# Patient Record
Sex: FEMALE | Race: WHITE | Employment: UNEMPLOYED | URBAN - METROPOLITAN AREA
[De-identification: names, ages, dates, MRNs, and addresses within clinical notes are randomized per-mention and may not be internally consistent; named-entity substitution may affect disease eponyms.]

---

## 2021-06-24 ENCOUNTER — APPOINTMENT (OUTPATIENT)
Dept: GENERAL RADIOLOGY | Age: 21
End: 2021-06-24
Payer: COMMERCIAL

## 2021-06-24 ENCOUNTER — HOSPITAL ENCOUNTER (EMERGENCY)
Age: 21
Discharge: HOME | End: 2021-06-24
Payer: COMMERCIAL

## 2021-06-24 VITALS
BODY MASS INDEX: 18.96 KG/M2 | WEIGHT: 107 LBS | RESPIRATION RATE: 14 BRPM | TEMPERATURE: 99.6 F | HEIGHT: 63 IN | OXYGEN SATURATION: 98 % | SYSTOLIC BLOOD PRESSURE: 119 MMHG | DIASTOLIC BLOOD PRESSURE: 57 MMHG | HEART RATE: 80 BPM

## 2021-06-24 DIAGNOSIS — J02.9 VIRAL PHARYNGITIS: ICD-10-CM

## 2021-06-24 DIAGNOSIS — R05.9 COUGH: Primary | ICD-10-CM

## 2021-06-24 DIAGNOSIS — B34.9 VIRAL ILLNESS: ICD-10-CM

## 2021-06-24 LAB
SARS-COV-2, NAAT: NOT DETECTED
STREP GRP A PCR: NEGATIVE

## 2021-06-24 PROCEDURE — 99284 EMERGENCY DEPT VISIT MOD MDM: CPT

## 2021-06-24 PROCEDURE — 87880 STREP A ASSAY W/OPTIC: CPT

## 2021-06-24 PROCEDURE — 6370000000 HC RX 637 (ALT 250 FOR IP): Performed by: NURSE PRACTITIONER

## 2021-06-24 PROCEDURE — 71046 X-RAY EXAM CHEST 2 VIEWS: CPT

## 2021-06-24 PROCEDURE — 87635 SARS-COV-2 COVID-19 AMP PRB: CPT

## 2021-06-24 PROCEDURE — 99283 EMERGENCY DEPT VISIT LOW MDM: CPT

## 2021-06-24 RX ORDER — ACETAMINOPHEN 500 MG
1000 TABLET ORAL ONCE
Status: COMPLETED | OUTPATIENT
Start: 2021-06-24 | End: 2021-06-24

## 2021-06-24 RX ADMIN — ACETAMINOPHEN 1000 MG: 500 TABLET ORAL at 11:55

## 2021-06-24 ASSESSMENT — PAIN DESCRIPTION - PROGRESSION: CLINICAL_PROGRESSION: NOT CHANGED

## 2021-06-24 ASSESSMENT — PAIN SCALES - GENERAL
PAINLEVEL_OUTOF10: 9
PAINLEVEL_OUTOF10: 9

## 2021-06-24 ASSESSMENT — PAIN DESCRIPTION - FREQUENCY: FREQUENCY: CONTINUOUS

## 2021-06-24 ASSESSMENT — PAIN DESCRIPTION - DESCRIPTORS: DESCRIPTORS: SORE

## 2021-06-24 ASSESSMENT — PAIN DESCRIPTION - LOCATION: LOCATION: THROAT

## 2021-06-24 NOTE — ED PROVIDER NOTES
[address] [place] [address]  Department of Emergency Medicine   ED  Encounter Note  Admit Date/RoomTime: 2021 11:23 AM  ED Room: 30-A/30-A    NAME: Zaida Mock  : 2000  MRN: [id number]     Chief Complaint:  Pharyngitis (x 2 days) and Cough (productive (green) x 2 days-hurts to breathe)    History of Present Illness        Zaida Mock is a 23 y.o. old female who presents to the emergency department by private vehicle, with sudden onset productive cough with sputum described as white, yellow and green which began 2 day(s) prior to arrival.  The symptoms are associated with nasal congestion, runny nose and sore throat and denies abdominal pain, AICD firing, calf pain, chest pain, dizziness, fatigue, leg swelling, palpitations, rapid heart beat, shortness of breath, slow heart beat or syncope. She has prior history of no history of pneumonia or bronchitis in the past.  Since onset the symptoms have been remaining constant and mild in severity. The symptoms are aggravated by nothing and relieved by nothing. ROS   Pertinent positives and negatives are stated within HPI, all other systems reviewed and are negative. Past Medical History:  has no past medical history on file. Surgical History:  has no past surgical history on file. Social History:  reports that she has never smoked. She does not have any smokeless tobacco history on file. She reports that she does not drink alcohol and does not use drugs. Family History: family history is not on file.      Allergies: Penicillins    Physical Exam   Oxygen Saturation Interpretation: Normal.        ED Triage Vitals   BP Temp Temp Source Pulse Resp SpO2 Height Weight   21 1121 21 1121 21 1121 21 1116 21 1121 21 1116 21 1121 21 1121   (!) 119/57 99.6 °F (37.6 °C) Tympanic 98 14 99 % 5' 3\" (1.6 m) 107 lb (48.5 kg)         Constitutional:  Alert, development consistent with age. HEENT:  NC/NT. Airway patent. Mild erythema and 1+ tonsillar hypertrophy bilateral.  Uvula is midline. Clear nasal congestion noted nostrils. TMs are normal bilateral.  Sterile ear canals are normal bilateral  Neck:  Normal ROM. Supple. Respiratory:  Breath sounds: equal bilaterally. Lung sounds: normal.   CV:  Regular rate and rhythm, normal heart sounds, without pathological murmurs, ectopy, gallops, or rubs. .  GI:  Abdomen Soft, nontender, good bowel sounds. No firm or pulsatile mass. Integument:  Normal turgor. Warm, dry, without visible rash. Lymphatic:  Edema:  none Bilateral lower extremity(s). Neurological:  Oriented. Motor functions intact. Lab / Imaging Results   (All laboratory and radiology results have been personally reviewed by myself)  Labs:  Results for orders placed or performed during the hospital encounter of 06/24/21   COVID-19, Rapid    Specimen: Nasopharyngeal Swab   Result Value Ref Range    SARS-CoV-2, NAAT Not Detected Not Detected   Strep Screen Group A Throat    Specimen: Throat   Result Value Ref Range    Strep Grp A PCR Negative Negative     Imaging: All Radiology results interpreted by Radiologist unless otherwise noted. XR CHEST (2 VW)   Final Result   No acute process. ED Course / Medical Decision Making     Medications   acetaminophen (TYLENOL) tablet 1,000 mg (1,000 mg Oral Given 6/24/21 1155)        Consult(s):   None    Procedure(s):   none    Medical Decision Making:    Patient presents to the ED for cough and sore throat. Differential diagnoses included but not limited to Covid versus strep versus pneumonia. Workup in the ED revealed vital signs revealed a slight temp of 99.6. Patient is drinking without any difficulties. COVID-19 was not detected. Strep was negative. Tonsil revealed mild erythema and 1+ tonsillar hypertrophy bilateral.  Uvula is midline. Patient is not hypoxic or tachycardic.   Chest x-ray reveals no acute processes. Patient continues to be non-toxic on re-evaluation. Findings were discussed with the patient and reasons to immediately return to the ED were articulated to them. They will follow-up with their PMD.      Plan of Care: Normal progression of disease discussed. All questions answered. Explained the rationale for symptomatic treatment rather than use of an antibiotic. Instruction provided in the use of fluids, vaporizer, acetaminophen, and other OTC medication for symptom control. Extra fluids  Analgesics as needed, dose reviewed. Plan of Care/Counseling:  FAUSTO Gunter CNP reviewed today's visit with the patient in addition to providing specific details for the plan of care and counseling regarding the diagnosis and prognosis. Questions are answered at this time and are agreeable with the plan. Assessment      1. Cough    2. Viral pharyngitis    3. Viral illness      Plan   Discharged home. Patient condition is stable    New Medications     There are no discharge medications for this patient. Electronically signed by FAUSTO Gunter CNP   DD: 6/24/21  **This report was transcribed using voice recognition software. Every effort was made to ensure accuracy; however, inadvertent computerized transcription errors may be present.   END OF ED PROVIDER NOTE       FAUSTO Gunter CNP  06/24/21 [id number]

## 2022-03-15 ENCOUNTER — APPOINTMENT (OUTPATIENT)
Dept: GENERAL RADIOLOGY | Age: 22
DRG: 885 | End: 2022-03-15
Payer: COMMERCIAL

## 2022-03-15 ENCOUNTER — APPOINTMENT (OUTPATIENT)
Dept: CT IMAGING | Age: 22
DRG: 885 | End: 2022-03-15
Payer: COMMERCIAL

## 2022-03-15 ENCOUNTER — HOSPITAL ENCOUNTER (INPATIENT)
Age: 22
LOS: 3 days | Discharge: HOME | DRG: 885 | End: 2022-03-18
Attending: EMERGENCY MEDICINE | Admitting: PSYCHIATRY & NEUROLOGY
Payer: COMMERCIAL

## 2022-03-15 DIAGNOSIS — F39 MOOD DISORDER (HCC): Primary | ICD-10-CM

## 2022-03-15 PROBLEM — F33.9 MDD (MAJOR DEPRESSIVE DISORDER), RECURRENT EPISODE (HCC): Status: ACTIVE | Noted: 2022-03-15

## 2022-03-15 LAB
ACETAMINOPHEN LEVEL: <5 MCG/ML (ref 10–30)
ALBUMIN SERPL-MCNC: 5.2 G/DL (ref 3.5–5.2)
ALP BLD-CCNC: 62 U/L (ref 35–104)
ALT SERPL-CCNC: 12 U/L (ref 0–32)
AMPHETAMINE SCREEN, URINE: NOT DETECTED
ANION GAP SERPL CALCULATED.3IONS-SCNC: 16 MMOL/L (ref 7–16)
AST SERPL-CCNC: 23 U/L (ref 0–31)
BACTERIA: ABNORMAL /HPF
BARBITURATE SCREEN URINE: NOT DETECTED
BASOPHILS ABSOLUTE: 0.03 E9/L (ref 0–0.2)
BASOPHILS RELATIVE PERCENT: 0.2 % (ref 0–2)
BENZODIAZEPINE SCREEN, URINE: NOT DETECTED
BILIRUB SERPL-MCNC: 0.8 MG/DL (ref 0–1.2)
BILIRUBIN DIRECT: <0.2 MG/DL (ref 0–0.3)
BILIRUBIN URINE: NEGATIVE
BILIRUBIN, INDIRECT: NORMAL MG/DL (ref 0–1)
BLOOD, URINE: ABNORMAL
BUN BLDV-MCNC: 7 MG/DL (ref 6–20)
CALCIUM SERPL-MCNC: 9.2 MG/DL (ref 8.6–10.2)
CANNABINOID SCREEN URINE: POSITIVE
CHLORIDE BLD-SCNC: 106 MMOL/L (ref 98–107)
CLARITY: CLEAR
CO2: 18 MMOL/L (ref 22–29)
COCAINE METABOLITE SCREEN URINE: NOT DETECTED
COLOR: YELLOW
CREAT SERPL-MCNC: 0.8 MG/DL (ref 0.5–1)
EKG ATRIAL RATE: 70 BPM
EKG P AXIS: 19 DEGREES
EKG P-R INTERVAL: 136 MS
EKG Q-T INTERVAL: 376 MS
EKG QRS DURATION: 98 MS
EKG QTC CALCULATION (BAZETT): 406 MS
EKG R AXIS: 86 DEGREES
EKG T AXIS: 50 DEGREES
EKG VENTRICULAR RATE: 70 BPM
EOSINOPHILS ABSOLUTE: 0 E9/L (ref 0.05–0.5)
EOSINOPHILS RELATIVE PERCENT: 0 % (ref 0–6)
EPITHELIAL CELLS, UA: ABNORMAL /HPF
ETHANOL: 196 MG/DL (ref 0–0.08)
ETHANOL: 202 MG/DL (ref 0–0.08)
ETHANOL: 45 MG/DL (ref 0–0.08)
FENTANYL SCREEN, URINE: NOT DETECTED
GFR AFRICAN AMERICAN: >60
GFR NON-AFRICAN AMERICAN: >60 ML/MIN/1.73
GLUCOSE BLD-MCNC: 106 MG/DL (ref 74–99)
GLUCOSE URINE: NEGATIVE MG/DL
HCG, URINE, POC: NEGATIVE
HCT VFR BLD CALC: 41.3 % (ref 34–48)
HEMOGLOBIN: 14 G/DL (ref 11.5–15.5)
IMMATURE GRANULOCYTES #: 0.12 E9/L
IMMATURE GRANULOCYTES %: 0.8 % (ref 0–5)
INFLUENZA A: NOT DETECTED
INFLUENZA B: NOT DETECTED
KETONES, URINE: ABNORMAL MG/DL
LEUKOCYTE ESTERASE, URINE: NEGATIVE
LYMPHOCYTES ABSOLUTE: 1.3 E9/L (ref 1.5–4)
LYMPHOCYTES RELATIVE PERCENT: 8.5 % (ref 20–42)
Lab: ABNORMAL
Lab: NORMAL
MCH RBC QN AUTO: 31.6 PG (ref 26–35)
MCHC RBC AUTO-ENTMCNC: 33.9 % (ref 32–34.5)
MCV RBC AUTO: 93.2 FL (ref 80–99.9)
METHADONE SCREEN, URINE: NOT DETECTED
MONOCYTES ABSOLUTE: 0.2 E9/L (ref 0.1–0.95)
MONOCYTES RELATIVE PERCENT: 1.3 % (ref 2–12)
NEGATIVE QC PASS/FAIL: NORMAL
NEUTROPHILS ABSOLUTE: 13.71 E9/L (ref 1.8–7.3)
NEUTROPHILS RELATIVE PERCENT: 89.2 % (ref 43–80)
NITRITE, URINE: NEGATIVE
OPIATE SCREEN URINE: NOT DETECTED
OXYCODONE URINE: NOT DETECTED
PDW BLD-RTO: 11.9 FL (ref 11.5–15)
PH UA: 5.5 (ref 5–9)
PHENCYCLIDINE SCREEN URINE: NOT DETECTED
PLATELET # BLD: 321 E9/L (ref 130–450)
PMV BLD AUTO: 10.5 FL (ref 7–12)
POSITIVE QC PASS/FAIL: NORMAL
POTASSIUM REFLEX MAGNESIUM: 4 MMOL/L (ref 3.5–5)
PROTEIN UA: ABNORMAL MG/DL
RBC # BLD: 4.43 E12/L (ref 3.5–5.5)
RBC UA: ABNORMAL /HPF (ref 0–2)
SALICYLATE, SERUM: <0.3 MG/DL (ref 0–30)
SARS-COV-2 RNA, RT PCR: NOT DETECTED
SODIUM BLD-SCNC: 140 MMOL/L (ref 132–146)
SPECIFIC GRAVITY UA: 1.01 (ref 1–1.03)
TOTAL PROTEIN: 8 G/DL (ref 6.4–8.3)
TRICYCLIC ANTIDEPRESSANTS SCREEN SERUM: NEGATIVE NG/ML
UROBILINOGEN, URINE: 0.2 E.U./DL
VALPROIC ACID LEVEL: 3 MCG/ML (ref 50–100)
WBC # BLD: 15.4 E9/L (ref 4.5–11.5)
WBC UA: ABNORMAL /HPF (ref 0–5)

## 2022-03-15 PROCEDURE — 70450 CT HEAD/BRAIN W/O DYE: CPT

## 2022-03-15 PROCEDURE — 81001 URINALYSIS AUTO W/SCOPE: CPT

## 2022-03-15 PROCEDURE — 6370000000 HC RX 637 (ALT 250 FOR IP): Performed by: EMERGENCY MEDICINE

## 2022-03-15 PROCEDURE — 93010 ELECTROCARDIOGRAM REPORT: CPT | Performed by: INTERNAL MEDICINE

## 2022-03-15 PROCEDURE — 80164 ASSAY DIPROPYLACETIC ACD TOT: CPT

## 2022-03-15 PROCEDURE — 93005 ELECTROCARDIOGRAM TRACING: CPT | Performed by: STUDENT IN AN ORGANIZED HEALTH CARE EDUCATION/TRAINING PROGRAM

## 2022-03-15 PROCEDURE — 71045 X-RAY EXAM CHEST 1 VIEW: CPT

## 2022-03-15 PROCEDURE — 82077 ASSAY SPEC XCP UR&BREATH IA: CPT

## 2022-03-15 PROCEDURE — 72125 CT NECK SPINE W/O DYE: CPT

## 2022-03-15 PROCEDURE — 6370000000 HC RX 637 (ALT 250 FOR IP): Performed by: PSYCHIATRY & NEUROLOGY

## 2022-03-15 PROCEDURE — 1240000000 HC EMOTIONAL WELLNESS R&B

## 2022-03-15 PROCEDURE — 80143 DRUG ASSAY ACETAMINOPHEN: CPT

## 2022-03-15 PROCEDURE — 80179 DRUG ASSAY SALICYLATE: CPT

## 2022-03-15 PROCEDURE — 73130 X-RAY EXAM OF HAND: CPT

## 2022-03-15 PROCEDURE — 6370000000 HC RX 637 (ALT 250 FOR IP): Performed by: STUDENT IN AN ORGANIZED HEALTH CARE EDUCATION/TRAINING PROGRAM

## 2022-03-15 PROCEDURE — 36415 COLL VENOUS BLD VENIPUNCTURE: CPT

## 2022-03-15 PROCEDURE — 80076 HEPATIC FUNCTION PANEL: CPT

## 2022-03-15 PROCEDURE — 87636 SARSCOV2 & INF A&B AMP PRB: CPT

## 2022-03-15 PROCEDURE — 80307 DRUG TEST PRSMV CHEM ANLYZR: CPT

## 2022-03-15 PROCEDURE — 99285 EMERGENCY DEPT VISIT HI MDM: CPT

## 2022-03-15 PROCEDURE — 80048 BASIC METABOLIC PNL TOTAL CA: CPT

## 2022-03-15 PROCEDURE — 73521 X-RAY EXAM HIPS BI 2 VIEWS: CPT

## 2022-03-15 PROCEDURE — 85025 COMPLETE CBC W/AUTO DIFF WBC: CPT

## 2022-03-15 RX ORDER — TRAZODONE HYDROCHLORIDE 50 MG/1
50 TABLET ORAL NIGHTLY PRN
Status: DISCONTINUED | OUTPATIENT
Start: 2022-03-15 | End: 2022-03-18 | Stop reason: HOSPADM

## 2022-03-15 RX ORDER — ACETAMINOPHEN 325 MG/1
650 TABLET ORAL EVERY 6 HOURS PRN
Status: DISCONTINUED | OUTPATIENT
Start: 2022-03-15 | End: 2022-03-18 | Stop reason: HOSPADM

## 2022-03-15 RX ORDER — ALPRAZOLAM 0.25 MG/1
0.5 TABLET ORAL ONCE
Status: COMPLETED | OUTPATIENT
Start: 2022-03-15 | End: 2022-03-15

## 2022-03-15 RX ORDER — CHLORDIAZEPOXIDE HYDROCHLORIDE 25 MG/1
25 CAPSULE, GELATIN COATED ORAL EVERY 6 HOURS
Status: DISCONTINUED | OUTPATIENT
Start: 2022-03-15 | End: 2022-03-16

## 2022-03-15 RX ORDER — HALOPERIDOL 5 MG
5 TABLET ORAL EVERY 6 HOURS PRN
Status: DISCONTINUED | OUTPATIENT
Start: 2022-03-15 | End: 2022-03-18 | Stop reason: HOSPADM

## 2022-03-15 RX ORDER — MAGNESIUM HYDROXIDE/ALUMINUM HYDROXICE/SIMETHICONE 120; 1200; 1200 MG/30ML; MG/30ML; MG/30ML
30 SUSPENSION ORAL PRN
Status: DISCONTINUED | OUTPATIENT
Start: 2022-03-15 | End: 2022-03-18 | Stop reason: HOSPADM

## 2022-03-15 RX ORDER — HALOPERIDOL 5 MG/ML
5 INJECTION INTRAMUSCULAR EVERY 6 HOURS PRN
Status: DISCONTINUED | OUTPATIENT
Start: 2022-03-15 | End: 2022-03-18 | Stop reason: HOSPADM

## 2022-03-15 RX ORDER — FOLIC ACID 1 MG/1
1 TABLET ORAL ONCE
Status: COMPLETED | OUTPATIENT
Start: 2022-03-15 | End: 2022-03-15

## 2022-03-15 RX ORDER — NICOTINE 21 MG/24HR
1 PATCH, TRANSDERMAL 24 HOURS TRANSDERMAL DAILY
Status: DISCONTINUED | OUTPATIENT
Start: 2022-03-16 | End: 2022-03-15 | Stop reason: ALTCHOICE

## 2022-03-15 RX ORDER — HYDROXYZINE PAMOATE 50 MG/1
50 CAPSULE ORAL 3 TIMES DAILY PRN
Status: DISCONTINUED | OUTPATIENT
Start: 2022-03-15 | End: 2022-03-18 | Stop reason: HOSPADM

## 2022-03-15 RX ORDER — GAUZE BANDAGE 2" X 2"
100 BANDAGE TOPICAL DAILY
Status: DISCONTINUED | OUTPATIENT
Start: 2022-03-15 | End: 2022-03-18 | Stop reason: HOSPADM

## 2022-03-15 RX ORDER — DIVALPROEX SODIUM 250 MG/1
250 TABLET, DELAYED RELEASE ORAL 2 TIMES DAILY
Status: DISCONTINUED | OUTPATIENT
Start: 2022-03-15 | End: 2022-03-18 | Stop reason: HOSPADM

## 2022-03-15 RX ORDER — NICOTINE 21 MG/24HR
1 PATCH, TRANSDERMAL 24 HOURS TRANSDERMAL ONCE
Status: COMPLETED | OUTPATIENT
Start: 2022-03-15 | End: 2022-03-16

## 2022-03-15 RX ORDER — MULTIVITAMIN WITH IRON
1 TABLET ORAL DAILY
Status: DISCONTINUED | OUTPATIENT
Start: 2022-03-15 | End: 2022-03-18 | Stop reason: HOSPADM

## 2022-03-15 RX ADMIN — FOLIC ACID 1 MG: 1 TABLET ORAL at 18:35

## 2022-03-15 RX ADMIN — MULTIVITAMIN TABLET 1 TABLET: TABLET at 18:35

## 2022-03-15 RX ADMIN — CHLORDIAZEPOXIDE HYDROCHLORIDE 25 MG: 25 CAPSULE ORAL at 23:03

## 2022-03-15 RX ADMIN — TRAZODONE HYDROCHLORIDE 50 MG: 50 TABLET ORAL at 21:06

## 2022-03-15 RX ADMIN — ALPRAZOLAM 0.5 MG: 0.25 TABLET ORAL at 04:45

## 2022-03-15 RX ADMIN — CHLORDIAZEPOXIDE HYDROCHLORIDE 25 MG: 25 CAPSULE ORAL at 18:35

## 2022-03-15 RX ADMIN — DIVALPROEX SODIUM 250 MG: 250 TABLET, DELAYED RELEASE ORAL at 21:06

## 2022-03-15 RX ADMIN — HYDROXYZINE PAMOATE 50 MG: 50 CAPSULE ORAL at 21:06

## 2022-03-15 RX ADMIN — THIAMINE HCL TAB 100 MG 100 MG: 100 TAB at 18:35

## 2022-03-15 RX ADMIN — NICOTINE POLACRILEX 4 MG: 2 GUM, CHEWING BUCCAL at 18:55

## 2022-03-15 SDOH — ECONOMIC STABILITY: HOUSING INSECURITY: IN THE LAST 12 MONTHS, HOW MANY PLACES HAVE YOU LIVED?: 2

## 2022-03-15 SDOH — HEALTH STABILITY: PHYSICAL HEALTH: ON AVERAGE, HOW MANY DAYS PER WEEK DO YOU ENGAGE IN MODERATE TO STRENUOUS EXERCISE (LIKE A BRISK WALK)?: 3 DAYS

## 2022-03-15 SDOH — ECONOMIC STABILITY: TRANSPORTATION INSECURITY
IN THE PAST 12 MONTHS, HAS LACK OF TRANSPORTATION KEPT YOU FROM MEETINGS, WORK, OR FROM GETTING THINGS NEEDED FOR DAILY LIVING?: NO

## 2022-03-15 SDOH — ECONOMIC STABILITY: FOOD INSECURITY: WITHIN THE PAST 12 MONTHS, THE FOOD YOU BOUGHT JUST DIDN'T LAST AND YOU DIDN'T HAVE MONEY TO GET MORE.: NEVER TRUE

## 2022-03-15 SDOH — ECONOMIC STABILITY: TRANSPORTATION INSECURITY
IN THE PAST 12 MONTHS, HAS THE LACK OF TRANSPORTATION KEPT YOU FROM MEDICAL APPOINTMENTS OR FROM GETTING MEDICATIONS?: NO

## 2022-03-15 SDOH — ECONOMIC STABILITY: HOUSING INSECURITY
IN THE LAST 12 MONTHS, WAS THERE A TIME WHEN YOU DID NOT HAVE A STEADY PLACE TO SLEEP OR SLEPT IN A SHELTER (INCLUDING NOW)?: NO

## 2022-03-15 SDOH — ECONOMIC STABILITY: FOOD INSECURITY: WITHIN THE PAST 12 MONTHS, YOU WORRIED THAT YOUR FOOD WOULD RUN OUT BEFORE YOU GOT MONEY TO BUY MORE.: NEVER TRUE

## 2022-03-15 SDOH — HEALTH STABILITY: PHYSICAL HEALTH: ON AVERAGE, HOW MANY MINUTES DO YOU ENGAGE IN EXERCISE AT THIS LEVEL?: 20 MIN

## 2022-03-15 SDOH — ECONOMIC STABILITY: INCOME INSECURITY: IN THE LAST 12 MONTHS, WAS THERE A TIME WHEN YOU WERE NOT ABLE TO PAY THE MORTGAGE OR RENT ON TIME?: NO

## 2022-03-15 ASSESSMENT — PAIN SCALES - GENERAL
PAINLEVEL_OUTOF10: 0
PAINLEVEL_OUTOF10: 2
PAINLEVEL_OUTOF10: 0

## 2022-03-15 ASSESSMENT — SLEEP AND FATIGUE QUESTIONNAIRES
DIFFICULTY ARISING: NO
RESTFUL SLEEP: NO
DIFFICULTY FALLING ASLEEP: NO
AVERAGE NUMBER OF SLEEP HOURS: 7
DO YOU USE A SLEEP AID: NO
DIFFICULTY STAYING ASLEEP: YES
DO YOU HAVE DIFFICULTY SLEEPING: YES
SLEEP PATTERN: DISTURBED/INTERRUPTED SLEEP

## 2022-03-15 ASSESSMENT — SOCIAL DETERMINANTS OF HEALTH (SDOH)
DO YOU BELONG TO ANY CLUBS OR ORGANIZATIONS SUCH AS CHURCH GROUPS UNIONS, FRATERNAL OR ATHLETIC GROUPS, OR SCHOOL GROUPS?: NO
HOW OFTEN DO YOU ATTEND CHURCH OR RELIGIOUS SERVICES?: NEVER
ARE YOU MARRIED, WIDOWED, DIVORCED, SEPARATED, NEVER MARRIED, OR LIVING WITH A PARTNER?: LIVING WITH PARTNER
WITHIN THE LAST YEAR, HAVE YOU BEEN HUMILIATED OR EMOTIONALLY ABUSED IN OTHER WAYS BY YOUR PARTNER OR EX-PARTNER?: NO
HOW OFTEN DO YOU GET TOGETHER WITH FRIENDS OR RELATIVES?: MORE THAN THREE TIMES A WEEK
WITHIN THE LAST YEAR, HAVE YOU BEEN AFRAID OF YOUR PARTNER OR EX-PARTNER?: NO
HOW HARD IS IT FOR YOU TO PAY FOR THE VERY BASICS LIKE FOOD, HOUSING, MEDICAL CARE, AND HEATING?: NOT HARD AT ALL
WITHIN THE LAST YEAR, HAVE TO BEEN RAPED OR FORCED TO HAVE ANY KIND OF SEXUAL ACTIVITY BY YOUR PARTNER OR EX-PARTNER?: NO
IN A TYPICAL WEEK, HOW MANY TIMES DO YOU TALK ON THE PHONE WITH FAMILY, FRIENDS, OR NEIGHBORS?: MORE THAN THREE TIMES A WEEK
WITHIN THE LAST YEAR, HAVE YOU BEEN KICKED, HIT, SLAPPED, OR OTHERWISE PHYSICALLY HURT BY YOUR PARTNER OR EX-PARTNER?: NO
HOW OFTEN DO YOU ATTENT MEETINGS OF THE CLUB OR ORGANIZATION YOU BELONG TO?: NEVER

## 2022-03-15 ASSESSMENT — LIFESTYLE VARIABLES
HISTORY_ALCOHOL_USE: YES
HOW OFTEN DO YOU HAVE A DRINK CONTAINING ALCOHOL: 2-3 TIMES A WEEK
HOW MANY STANDARD DRINKS CONTAINING ALCOHOL DO YOU HAVE ON A TYPICAL DAY: 1 OR 2

## 2022-03-15 ASSESSMENT — PATIENT HEALTH QUESTIONNAIRE - PHQ9
SUM OF ALL RESPONSES TO PHQ9 QUESTIONS 1 & 2: 0
1. LITTLE INTEREST OR PLEASURE IN DOING THINGS: NOT AT ALL
DEPRESSION UNABLE TO ASSESS: YES
2. FEELING DOWN, DEPRESSED OR HOPELESS: NOT AT ALL

## 2022-03-15 ASSESSMENT — ENCOUNTER SYMPTOMS
COUGH: 0
SINUS PRESSURE: 0
NAUSEA: 0
VOMITING: 0
WHEEZING: 0
DIARRHEA: 0
EYE DISCHARGE: 0
ABDOMINAL DISTENTION: 0
SORE THROAT: 0
SHORTNESS OF BREATH: 0
EYE PAIN: 0
BACK PAIN: 0

## 2022-03-15 NOTE — ED NOTES
Attempted to call Marie Jorgensen NP to present patient for admission. She states she will call me back when the phone call she is on is completed.       Tacos Barajas RN  03/15/22 9838

## 2022-03-15 NOTE — ED NOTES
Pt on the phone with family, using profanity, and is threatening to leave. Making statements that \"I'm not crazy. This place is for crazy people. These people don't care about me. They are literally sitting there doing nothing. \" She has been repeatedly asked to stop being disruptive and using inappropriate language. Uncooperative and disrespectful with staff. John Julian explained to her at length that as soon as she can, she will evaluate her needs. Behavior is egocentric.      Crystal Vale RN  03/15/22 6247
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
## 2022-03-15 NOTE — ED NOTES
Spoke with provider regarding elevated WBC count of 14.4  Provider stated there are no signs of infection and it is likely reactive.      Tate King RN  03/15/22 [id number]
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
## 2022-03-15 NOTE — ED NOTES
Pt yelling and being uncooperative with RN with trying to obtain blood work.       Jose Angel Wolf, MSW, LSW  03/15/22 0157

## 2022-03-15 NOTE — PROGRESS NOTES
Attended evening relaxation group. Understanding of handout on mindfulness meditation and enjoyed 10 minute guided meditation exercise. Was 1 of 10 in attendance.
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
## 2022-03-15 NOTE — ED NOTES
PT has no insurance    I called [facility] and spoke to [place] who advised that there are 8 on the wait list     Faxed facesheet to [facility] is aware             Deborha Dandy, LSW  03/15/22 [id number]

## 2022-03-15 NOTE — ED NOTES
Patient denies any symptoms of alcohol withdrawal.  Sates \"I feel pretty good right now\"  After I got some sleep.      Dennis Santiago RN  03/15/22 [id number]

## 2022-03-15 NOTE — ED NOTES
Pt is being disruptive talking on the phone and yelling, screaming and becoming more agitated and raising voice.  asked Pt to please try to calm down and be respectful to other Pts on unit or phone privileges would be taken away.       MARIAM Sumner, [place]  03/15/22 0007

## 2022-03-16 PROBLEM — F31.81 BIPOLAR 2 DISORDER (HCC): Status: ACTIVE | Noted: 2022-03-16

## 2022-03-16 PROBLEM — F19.10 POLYSUBSTANCE ABUSE (HCC): Status: ACTIVE | Noted: 2022-03-16

## 2022-03-16 PROCEDURE — 6370000000 HC RX 637 (ALT 250 FOR IP): Performed by: PSYCHIATRY & NEUROLOGY

## 2022-03-16 PROCEDURE — 99221 1ST HOSP IP/OBS SF/LOW 40: CPT | Performed by: NURSE PRACTITIONER

## 2022-03-16 PROCEDURE — 1240000000 HC EMOTIONAL WELLNESS R&B

## 2022-03-16 RX ORDER — CHLORDIAZEPOXIDE HYDROCHLORIDE 25 MG/1
25 CAPSULE, GELATIN COATED ORAL EVERY 6 HOURS PRN
Status: DISCONTINUED | OUTPATIENT
Start: 2022-03-16 | End: 2022-03-18 | Stop reason: HOSPADM

## 2022-03-16 RX ADMIN — TRAZODONE HYDROCHLORIDE 50 MG: 50 TABLET ORAL at 21:03

## 2022-03-16 RX ADMIN — MULTIVITAMIN TABLET 1 TABLET: TABLET at 10:36

## 2022-03-16 RX ADMIN — DIVALPROEX SODIUM 250 MG: 250 TABLET, DELAYED RELEASE ORAL at 21:03

## 2022-03-16 RX ADMIN — NICOTINE POLACRILEX 4 MG: 2 GUM, CHEWING BUCCAL at 17:35

## 2022-03-16 RX ADMIN — THIAMINE HCL TAB 100 MG 100 MG: 100 TAB at 10:36

## 2022-03-16 RX ADMIN — NICOTINE POLACRILEX 4 MG: 2 GUM, CHEWING BUCCAL at 10:36

## 2022-03-16 RX ADMIN — CHLORDIAZEPOXIDE HYDROCHLORIDE 25 MG: 25 CAPSULE ORAL at 05:39

## 2022-03-16 RX ADMIN — DIVALPROEX SODIUM 250 MG: 250 TABLET, DELAYED RELEASE ORAL at 10:36

## 2022-03-16 RX ADMIN — HYDROXYZINE PAMOATE 50 MG: 50 CAPSULE ORAL at 21:03

## 2022-03-16 ASSESSMENT — SLEEP AND FATIGUE QUESTIONNAIRES
RESTFUL SLEEP: YES
DO YOU USE A SLEEP AID: NO
DIFFICULTY STAYING ASLEEP: YES
SLEEP PATTERN: DISTURBED/INTERRUPTED SLEEP
DIFFICULTY FALLING ASLEEP: NO
DO YOU HAVE DIFFICULTY SLEEPING: YES
DIFFICULTY ARISING: NO
AVERAGE NUMBER OF SLEEP HOURS: 10

## 2022-03-16 ASSESSMENT — LIFESTYLE VARIABLES: HISTORY_ALCOHOL_USE: YES

## 2022-03-16 ASSESSMENT — PAIN SCALES - GENERAL: PAINLEVEL_OUTOF10: 0

## 2022-03-16 NOTE — BH NOTE
Is cooperative, denies suicidal / homicidal ideations. Pt denies hallucinations. Pt remains pleasant and bright in affect. Pt does not appear to be depressed. Will follow and monitor.
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
## 2022-03-16 NOTE — BH NOTE
[facility]  Initial Interdisciplinary Treatment Plan NOTE    Review Date & Time: 3-16-22  900 am    Patient was not in treatment team    Admission Type:   Admission Type: Involuntary    Reason for admission:         Estimated Length of Stay Update:  3-5 days  Estimated Discharge Date Update: 3-5 days    EDUCATION:   Learner Progress Toward Treatment Goals: Reviewed results and recommendations of this team and Reviewed group plan and strategies    Method: Small group    Outcome: Verbalized understanding    PATIENT GOALS: Pt did not report a goal during first morning assessment by this RN. PLAN/TREATMENT RECOMMENDATIONS UPDATE: Begin Medication regimen and assess pt responses. GOALS UPDATE:   Time frame for Short-Term Goals: Daily re assessment.      Hermilo Obrien RN

## 2022-03-16 NOTE — PLAN OF CARE
Pt is stable, appears brightened. Pt denies suicidal / homicidal ideations. Pt denies hallucinations. Pt does not report a goal during first morning assessment. Will follow and monitor.

## 2022-03-17 PROCEDURE — 1240000000 HC EMOTIONAL WELLNESS R&B

## 2022-03-17 PROCEDURE — 6370000000 HC RX 637 (ALT 250 FOR IP): Performed by: PSYCHIATRY & NEUROLOGY

## 2022-03-17 PROCEDURE — 99232 SBSQ HOSP IP/OBS MODERATE 35: CPT | Performed by: NURSE PRACTITIONER

## 2022-03-17 RX ADMIN — NICOTINE POLACRILEX 4 MG: 2 GUM, CHEWING BUCCAL at 21:13

## 2022-03-17 RX ADMIN — DIVALPROEX SODIUM 250 MG: 250 TABLET, DELAYED RELEASE ORAL at 08:44

## 2022-03-17 RX ADMIN — NICOTINE POLACRILEX 4 MG: 2 GUM, CHEWING BUCCAL at 08:44

## 2022-03-17 RX ADMIN — NICOTINE POLACRILEX 4 MG: 2 GUM, CHEWING BUCCAL at 17:23

## 2022-03-17 RX ADMIN — THIAMINE HCL TAB 100 MG 100 MG: 100 TAB at 08:44

## 2022-03-17 RX ADMIN — TRAZODONE HYDROCHLORIDE 50 MG: 50 TABLET ORAL at 21:12

## 2022-03-17 RX ADMIN — MULTIVITAMIN TABLET 1 TABLET: TABLET at 08:44

## 2022-03-17 RX ADMIN — NICOTINE POLACRILEX 4 MG: 2 GUM, CHEWING BUCCAL at 12:30

## 2022-03-17 RX ADMIN — DIVALPROEX SODIUM 250 MG: 250 TABLET, DELAYED RELEASE ORAL at 21:12

## 2022-03-17 ASSESSMENT — PAIN SCALES - GENERAL
PAINLEVEL_OUTOF10: 0
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
## 2022-03-17 NOTE — PROGRESS NOTES
Attended morning community meeting. Updated on staffing and daily expectations. Shared goal for the day as stay positive.

## 2022-03-17 NOTE — PROGRESS NOTES
Attended afternoon meet and greet. Updated on staffing and evening schedule. Participated in [id number] tria. Patient was 1 of 19 in attendance.

## 2022-03-17 NOTE — CARE COORDINATION
Sw contacted [facility] and was informed that pt is in collections and an appointment cannot be scheduled until she makes a payment.

## 2022-03-17 NOTE — PROGRESS NOTES
BEHAVIORAL HEALTH FOLLOW-UP NOTE     3/17/2022     Patient was seen and examined in person, Chart reviewed   Patient's case discussed with staff/team    Chief Complaint: \" I do not want anyone to talk to my boyfriend anymore. \"    Interim History: Patient seen up on the unit visible in the milieu she is out bright pleasant social with peers attending groups. She states she no longer wants anyone to talk to her boyfriend and she does not plan on going to stay with him on discharge. She is not sure if they are \"breaking up. \"  States that she does not want to live with him on discharge at this time and is going to go to her sister's house. She states this is not the sister who is been using drugs with different sister. She denies SI/HI intent or plan denies any auditory visualizations she has not had any behavioral disturbances on the unit. Bright pleasant affect eating well sleeping well no neurovegetative signs of depression      Appetite:   [x] Normal/Unchanged  [] Increased  [] Decreased      Sleep:       [x] Normal/Unchanged  [] Fair       [] Poor              Energy:    [x] Normal/Unchanged  [] Increased  [] Decreased        SI [] Present  [x] Absent    HI  []Present  [x] Absent     Aggression:  [] yes  [x] no    Patient is [x] able  [] unable to CONTRACT FOR SAFETY     PAST MEDICAL/PSYCHIATRIC HISTORY:   History reviewed. No pertinent past medical history. FAMILY/SOCIAL HISTORY:  History reviewed. No pertinent family history.   Social History     Socioeconomic History    Marital status: Life Partner     Spouse name: Not on file    Number of children: 0    Years of education: 15    Highest education level: Not on file   Occupational History    Not on file   Tobacco Use    Smoking status: Current Every Day Smoker     Types: Cigarettes    Smokeless tobacco: Not on file    Tobacco comment: vapes   Vaping Use    Vaping Use: Every day    Substances: Nicotine, THC   Substance and Sexual Activity    Alcohol use: Yes     Alcohol/week: 4.0 standard drinks     Types: 4 Cans of beer per week    Drug use: Yes     Types: Marijuana Willem Budge)    Sexual activity: Yes     Partners: Male   Other Topics Concern    Not on file   Social History Narrative    Not on file     Social Determinants of Health     Financial Resource Strain: Low Risk     Difficulty of Paying Living Expenses: Not hard at all   Food Insecurity: No Food Insecurity    Worried About [address] in the Last Year: Never true    [address] [employer] in the Last Year: Never true   Transportation Needs: No Transportation Needs    Lack of Transportation (Medical): No    Lack of Transportation (Non-Medical): No   Physical Activity: Insufficiently Active    Days of Exercise per Week: 3 days    Minutes of Exercise per Session: 20 min   Stress: Stress Concern Present    Feeling of Stress : To some extent   Social Connections: Moderately Isolated    Frequency of Communication with Friends and Family: More than three times a week    Frequency of Social Gatherings with Friends and Family: More than three times a week    Attends Alevism Services: Never    Active Member of Clubs or Organizations: No    Attends Club or Organization Meetings: Never    Marital Status: Living with partner   Intimate Partner Violence: Not At Risk    Fear of Current or Ex-Partner: No    Emotionally Abused: No    Physically Abused: No    Sexually Abused: No   Housing Stability: [address] Unable to Pay for Housing in the Last Year: No    Number of [place] in the Last Year: 2    Unstable Housing in the Last Year: No           ROS:  [x] All negative/unchanged except if checked.  Explain positive(checked items) below:  [] Constitutional  [] Eyes  [] Ear/Nose/Mouth/Throat  [] Respiratory  [] CV  [] GI  []   [] Musculoskeletal  [] Skin/Breast  [] Neurological  [] Endocrine  [] Heme/Lymph  [] Allergic/Immunologic    Explanation:     MEDICATIONS:    Current Facility-Administered Medications:     chlordiazePOXIDE (LIBRIUM) capsule 25 mg, 25 mg, Oral, N7S PRN, Wendi Quinn, APRN - CNP    acetaminophen (TYLENOL) tablet 650 mg, 650 mg, Oral, Q6H PRN, Omar Gonzalez MD    magnesium hydroxide (MILK OF MAGNESIA) 400 MG/5ML suspension 30 mL, 30 mL, Oral, Daily PRN, Omar Gonzalez MD    aluminum & magnesium hydroxide-simethicone (MAALOX) 200-200-20 MG/5ML suspension 30 mL, 30 mL, Oral, PRN, Omar Gonzalez MD    hydrOXYzine (VISTARIL) capsule 50 mg, 50 mg, Oral, TID PRN, Omar Gonzalez MD, 50 mg at 03/16/22 2103    haloperidol (HALDOL) tablet 5 mg, 5 mg, Oral, Q6H PRN **OR** haloperidol lactate (HALDOL) injection 5 mg, 5 mg, IntraMUSCular, Q6H PRN, Omar Gonzalez MD    traZODone (DESYREL) tablet 50 mg, 50 mg, Oral, Nightly PRN, Omar Gonzalez MD, 50 mg at 03/16/22 2103    thiamine mononitrate tablet 100 mg, 100 mg, Oral, Daily, Omar Gonzalez MD, 100 mg at 03/17/22 0844    divalproex (DEPAKOTE) DR tablet 250 mg, 250 mg, Oral, BID, Omar Gonzalez MD, 250 mg at 03/17/22 0844    multivitamin 1 tablet, 1 tablet, Oral, Daily, Omar Gonzalez MD, 1 tablet at 03/17/22 0844    nicotine polacrilex (NICORETTE) gum 4 mg, 4 mg, Oral, Q2H PRN, Omar Gonzaelz MD, 4 mg at 03/17/22 1230      Examination:  /67   Pulse 71   Temp 98.3 °F (36.8 °C) (Infrared)   Resp 14   Ht 5' 3\" (1.6 m)   Wt 107 lb (48.5 kg)   LMP 02/15/2022   SpO2 98%   BMI 18.95 kg/m²   Gait - steady  Medication side effects(SE): Denies    Mental Status Examination:    Level of consciousness:  within normal limits   Appearance:  fair grooming and fair hygiene  Behavior/Motor:  no abnormalities noted  Attitude toward examiner:  cooperative  Speech:  spontaneous, normal rate and normal volume   Mood: \" My mood is good. \"  Affect: Bright appropriate and pleasant  Thought processes: Linear without flight of ideas loose associations  Thought content: Devoid of any auditory visualizations delusions or the perceptual arise. Denies SI/HI intent or plan  Cognition:  oriented to person, place, and time   Concentration intact  Insight poor   Judgement poor     ASSESSMENT:   Patient symptoms are:  [] Well controlled  [x] Improving  [] Worsening  [] No change      Diagnosis:   Principal Problem:    Bipolar 2 disorder ([facility]  Active Problems:    Polysubstance abuse ([facility]  Resolved Problems:    * No resolved hospital problems. *      LABS:    Recent Labs     03/15/22  0159   WBC 15.4*   HGB 14.0        Recent Labs     03/15/22  0159      K 4.0      CO2 18*   BUN 7   CREATININE 0.8   GLUCOSE 106*     Recent Labs     03/15/22  0159   BILITOT 0.8   ALKPHOS 62   AST 23   ALT 12     Lab Results   Component Value Date    LABAMPH NOT DETECTED 03/15/2022    BARBSCNU NOT DETECTED 03/15/2022    LABBENZ NOT DETECTED 03/15/2022    LABMETH NOT DETECTED 03/15/2022    OPIATESCREENURINE NOT DETECTED 03/15/2022    PHENCYCLIDINESCREENURINE NOT DETECTED 03/15/2022    ETOH 45 03/15/2022     No results found for: TSH, FREET4  No results found for: LITHIUM  Lab Results   Component Value Date    VALPROATE 3 (L) 03/15/2022           Treatment Plan:  Reviewed current Medications with the patient. Risks, benefits, side effects, drug-to-drug interactions and alternatives to treatment were discussed. Collateral information:   CD evaluation  Encourage patient to attend group and other milieu activities.   Discharge planning discussed with the patient and treatment team.    Continue Depakote 250 mg twice daily for mood stabilization      PSYCHOTHERAPY/COUNSELING:  [x] Therapeutic interview  [x] Supportive  [] CBT  [] Ongoing  [] Other    [x] Patient continues to need, on a daily basis, active treatment furnished directly by or requiring the supervision of inpatient psychiatric personnel      Anticipated Length of stay: 3 to 7 days based on stability            Electronically signed by Carlos Markham APRN - CNP on 3/17/2022 at 2:16 PM
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
## 2022-03-17 NOTE — PLAN OF CARE
Attendedthe 1 h after supper group therapy horses and lessons we can learn from them. Attentive to all a/v materials and discussion / comments fr peers. Completed  presentation assignment. Scored 100 % on written project. Appeared to digest key learning points.

## 2022-03-17 NOTE — PROGRESS NOTES
Spiritual Support Group Note    Number of Participants in Group:   11                     Time:   2    Goal: Relief from isolation and loneliness             Ayana Sharing             Self-understanding and gain insight              Acceptance and belonging            Recognize they are not alone                Socialization             Empowerment       Encouragement    Topic:  [x] Spiritual Wellness and Self Care                  [x] Hope                     [] Connecting with Divine/Others        [] Thankfulness and Gratitude               [x]  Meaningfulness and Purpose               [] Forgiveness               [] Peace               [] Connect to [employer]      [] Other    Participation Level:   [x] Active Listener   [] Minimal   [] Monopolizing   [] Interactive   [] No Participation   []  Other:     Attention:   [x] Alert   [] Distractible   [] Drowsy   [] Poor   [] Other:    Manner:   [x] Cooperative   [] Suspicious   [] Withdrawn   [] Guarded   [] Irritable   [] Inhospitable   [] Other:     Others Comments from Group:

## 2022-03-17 NOTE — CARE COORDINATION
Ortega contacted [facility] and was informed that pt balance has been paid. An appointment was scheduled with pt counselor for 3/23, pt will be scheduled for medication management at that time.
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
## 2022-03-17 NOTE — BH NOTE
Out on unit denies any SI HI or ryan assisted to shower talking to family on phone today behavior relative good control emotional support given

## 2022-03-18 VITALS
WEIGHT: 107 LBS | TEMPERATURE: 97.9 F | DIASTOLIC BLOOD PRESSURE: 62 MMHG | HEIGHT: 63 IN | HEART RATE: 74 BPM | RESPIRATION RATE: 16 BRPM | BODY MASS INDEX: 18.96 KG/M2 | SYSTOLIC BLOOD PRESSURE: 130 MMHG | OXYGEN SATURATION: 97 %

## 2022-03-18 PROCEDURE — 99239 HOSP IP/OBS DSCHRG MGMT >30: CPT | Performed by: NURSE PRACTITIONER

## 2022-03-18 PROCEDURE — 6370000000 HC RX 637 (ALT 250 FOR IP): Performed by: PSYCHIATRY & NEUROLOGY

## 2022-03-18 RX ORDER — HYDROXYZINE PAMOATE 50 MG/1
50 CAPSULE ORAL 3 TIMES DAILY PRN
Qty: 24 CAPSULE | Refills: 0 | Status: SHIPPED | OUTPATIENT
Start: 2022-03-18 | End: 2022-04-01

## 2022-03-18 RX ORDER — TRAZODONE HYDROCHLORIDE 50 MG/1
50 TABLET ORAL NIGHTLY PRN
Qty: 14 TABLET | Refills: 0 | Status: SHIPPED | OUTPATIENT
Start: 2022-03-18 | End: 2022-04-01

## 2022-03-18 RX ORDER — DIVALPROEX SODIUM 250 MG/1
250 TABLET, DELAYED RELEASE ORAL 2 TIMES DAILY
Qty: 60 TABLET | Refills: 0 | Status: SHIPPED | OUTPATIENT
Start: 2022-03-18 | End: 2022-04-17

## 2022-03-18 RX ADMIN — NICOTINE POLACRILEX 4 MG: 2 GUM, CHEWING BUCCAL at 09:29

## 2022-03-18 RX ADMIN — THIAMINE HCL TAB 100 MG 100 MG: 100 TAB at 09:29

## 2022-03-18 RX ADMIN — ACETAMINOPHEN 650 MG: 325 TABLET ORAL at 09:30

## 2022-03-18 RX ADMIN — MULTIVITAMIN TABLET 1 TABLET: TABLET at 09:29

## 2022-03-18 RX ADMIN — DIVALPROEX SODIUM 250 MG: 250 TABLET, DELAYED RELEASE ORAL at 09:29

## 2022-03-18 ASSESSMENT — PAIN SCALES - GENERAL: PAINLEVEL_OUTOF10: 4

## 2022-03-18 NOTE — PLAN OF CARE
Problem: Risk of Harm:  Goal: Ability to remain free from injury will improve  Description: Ability to remain free from injury will improve  3/18/2022 0912 by Jersey Stafford RN  Outcome: Completed  3/17/2022 2036 by Girma Oden RN  Outcome: Ongoing     Problem: Altered Mood, Psychotic Behavior:  Goal: Able to demonstrate trust by eating, participating in treatment and following staff's direction  Description: Able to demonstrate trust by eating, participating in treatment and following staff's direction  Outcome: Completed  Goal: Able to verbalize decrease in frequency and intensity of hallucinations  Description: Able to verbalize decrease in frequency and intensity of hallucinations  3/18/2022 0912 by Jersey Stafford RN  Outcome: Completed  3/17/2022 2036 by Girma Oden RN  Outcome: Ongoing  Goal: Able to verbalize reality based thinking  Description: Able to verbalize reality based thinking  3/18/2022 0912 by Jersey Stafford RN  Outcome: Completed  3/17/2022 2036 by Girma Oden RN  Outcome: Ongoing  Goal: Absence of self-harm  Description: Absence of self-harm  3/18/2022 0912 by Jresey Stafford RN  Outcome: Completed  3/17/2022 2036 by Girma Oden RN  Outcome: Ongoing  Goal: Ability to achieve adequate nutritional intake will improve  Description: Ability to achieve adequate nutritional intake will improve  Outcome: Completed  Goal: Ability to interact with others will improve  Description: Ability to interact with others will improve  Outcome: Completed  Goal: Compliance with prescribed medication regimen will improve  Description: Compliance with prescribed medication regimen will improve  3/18/2022 0912 by Jersey Stafford RN  Outcome: Completed  3/17/2022 2036 by Girma Oden RN  Outcome: Ongoing  Goal: Patient specific goal  Description: Patient specific goal  Outcome: Completed
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
## 2022-03-18 NOTE — GROUP NOTE
Group Therapy Note    Date: 3/18/2022    Group Start Time: 1000  Group End Time: 9494  Group Topic: Psychoeducation    [facility]  [id number] [place], [place]        Group Therapy Note                                              Date: 3/18/2022  Type of Group: Psychoeducation  [employer] students conducted group   Wellness Binder Information  Module Name: coping skills      Patient's Goal:  Patient will be able to id what skills can help them manage their stressors. Notes:  Pleasant and sharing in group. Will to read and share with others. Status After Intervention:  Improved    Participation Level:  Active Listener and Interactive    Participation Quality: Appropriate, Attentive, Sharing, and Supportive      Speech: normal       Thought Process/Content: Logical      Affective Functioning: Congruent      Mood: euthymic      Level of consciousness:  Alert, Oriented x4, and Attentive      Response to Learning: Able to verbalize/acknowledge new learning, Able to retain information, and Progressing to goal      Endings: None Reported    Modes of Intervention: Education, Support, Socialization, and Exploration      Discipline Responsible: Psychoeducational Specialist      Signature:  Ace Filter

## 2022-03-18 NOTE — BH NOTE
[facility]  Discharge Note    Pt discharged with followings belongings:   Dental Appliances: None  Vision - Corrective Lenses: None  Hearing Aid: None  Jewelry: None  Clothing: [place]  Were All Patient Medications Collected?: Not Applicable  Other Valuables: Cell phone   Valuables sent home with patient or returned to patient. Patient education on aftercare instructions: yes  Information faxed to n/a by n/a  At 14:31p.mAlicia Shine Patient verbalize understanding of AVS:  yes.     Status EXAM upon discharge:  Status and Exam  Normal: Yes  Facial Expression: Brightened  Affect: Appropriate  Level of Consciousness: Alert  Mood:Normal: Yes  Mood: Anxious  Motor Activity:Normal: Yes  Interview Behavior: Cooperative  Preception: Picher to Person,Picher to Time,Picher to Place,Picher to Situation  Attention:Normal: Yes  Attention: Distractible  Thought Processes: Circumstantial  Thought Content:Normal: Yes  Thought Content: Preoccupations  Hallucinations: None  Delusions: No  Memory:Normal: Yes  Memory: Poor Recent  Insight and Judgment: Yes  Insight and Judgment: Poor Insight  Present Suicidal Ideation: No  Present Homicidal Ideation: No      Metabolic Screening:    No results found for: LABA1C    No results found for: CHOL  No results found for: TRIG  No results found for: HDL  No components found for: LDLCAL  No results found for: Antonia Lilly RN

## 2022-03-18 NOTE — CARE COORDINATION
Ortega received a call from Sandra. Sandra reports he does not feel comfortable with pt returning to his home at discharge. Ortega advised Jj that the RICH has been revoked so Ortega can no longer discuss pt discharge/treatment with him. Sandra stated the phone that pt has here is actually his phone that he pays for and he wants it back. Ortega advised Jj that we cannot take the phone from pt, he will have to talk with pt about this. Sandra repeatedly stated it is his phone and it should be returning to the rightful owner. Ortega again advised Sandra that this situation is out of Ortega hands, again stated he and pt will have to work on this. Sandra became upset and terminated the phone call.

## 2022-03-18 NOTE — CARE COORDINATION
Sw spoke with pt about her discharge. Pt reports feeling great today. Pt stated the medications are really helpful and she is glad she was started on them. Pt denied SI/HI/AVH. Collateral was done with pt sister who expressed no concerns for pt and confirmed she does not have access to any guns or other weapons. Pt stated her dad is going to pick her up and she will be at her parents house for a few hours until her sister comes home. Pt still plans on staying with her sister and will follow up with [facility]. Pt is bright, pleasant, cooperative, good eye contact, clear speech, good insight/judgement.

## 2022-03-18 NOTE — PROGRESS NOTES
Attended morning community meeting. Updated on staffing and daily expectations. Shared goal for the day as to learn how to ok with me.

## 2022-03-18 NOTE — BH NOTE
Patient denies thoughts to harm self or others, denies hallucinations. Patient is compliant with medications and attends groups. Patient is social with peers and staff. Patient behavior is in control. Patient appetite is good.

## 2022-03-18 NOTE — GROUP NOTE
Group Therapy Note    Date: 3/18/2022    Group Start Time: 1100  Group End Time: 1150  Group Topic: Psychotherapy    [facility]    MARIAM Fink LSW        Group Therapy Note    Attendees: 8         Patient's Goal: To increase social interaction and improve relationships with others. Notes: Pt was attentive in group and was able to identify an agenda. She was also able to verbalize relating to others within the group. Status After Intervention:  Improved    Participation Level:  Active Listener and Interactive    Participation Quality: Appropriate, Attentive, Sharing and Supportive      Speech:  normal      Thought Process/Content: Logical      Affective Functioning: Congruent      Mood: anxious      Level of consciousness:  Alert and Oriented x4      Response to Learning: Able to verbalize current knowledge/experience      Endings: None Reported    Modes of Intervention: Support, Socialization and Exploration      Discipline Responsible: /Counselor      Signature:  MARIAM Nguyen LSW

## 2022-03-18 NOTE — BH NOTE
[facility] 3 Interdisciplinary Treatment Plan NOTE    Review Date & Time: 3/18/2022 0930    Patient was not in treatment team    Estimated Length of Stay Update:  Discharge today  Estimated Discharge Date Update: 3/18/2022    EDUCATION:   Learner Progress Toward Treatment Goals: Reviewed group plan and strategies    Method: Small group    Outcome: Verbalized understanding    PATIENT GOALS: medication compliance     PLAN/TREATMENT RECOMMENDATIONS UPDATE:keep follow up appointments    GOALS UPDATE:   Time frame for Short-Term Goals: discharge today      Lizbeth Saldaña RN

## 2022-03-18 NOTE — CARE COORDINATION
In order to ensure appropriate transition and discharge planning is in place, the following documents have been transmitted to [facility], as the new outpatient provider:     The d/c diagnosis was transmitted to the next care provider   The reason for hospitalization was transmitted to the next care provider   The d/c medications (dosage and indication) were transmitted to the next care provider    The continuing care plan was transmitted to the next care provider